# Patient Record
Sex: FEMALE | Employment: PART TIME | URBAN - METROPOLITAN AREA
[De-identification: names, ages, dates, MRNs, and addresses within clinical notes are randomized per-mention and may not be internally consistent; named-entity substitution may affect disease eponyms.]

---

## 2019-07-02 ENCOUNTER — GENETICS ENCOUNTER (OUTPATIENT)
Dept: GENETICS | Facility: HOSPITAL | Age: 54
End: 2019-07-02
Attending: GENETIC COUNSELOR, MS
Payer: COMMERCIAL

## 2019-07-02 DIAGNOSIS — Z80.3 FAMILY HISTORY OF BREAST CANCER: Primary | ICD-10-CM

## 2019-07-02 DIAGNOSIS — Z84.81 FAMILY HISTORY OF GENE MUTATION: ICD-10-CM

## 2019-07-02 PROCEDURE — 96040 HC GENETIC COUNSELING EA 30 MIN: CPT | Performed by: GENETIC COUNSELOR, MS

## 2019-07-02 NOTE — PROGRESS NOTES
Referring Provider: Quyen Cleveland     Additional Provider: Dr. Peter Martins      Reason for Referral:  Ms. Rachel Sepulveda was referred for genetic counseling because of a  family history of breast cancer and a known RAD51D mutation in th The sister (no history of cancer) underwent [facility] Common Hereditary Cancer panel. 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 Bates’s risk to develop ovarian cancer is expected to be similar to those seen in the general population.      Uncertain: Because genetic testing is complete se ages or 36-53, or earlier dependent on family history. Whether RAD51D mutations increase the risk for breast cancer has been debated. Some studies have suggested no increased risk. However, a recent large study ([facility].  2017;3(9):119 and will offer Ms. Bates the $250 self-pay price if the testing is not covered by insurance. I anticipate that Ms. Bates's results will be available within 2-3 weeks and will call her with the results.       Approximately 40 minutes was spent in [place]

## 2019-07-18 ENCOUNTER — GENETICS ENCOUNTER (OUTPATIENT)
Dept: HEMATOLOGY/ONCOLOGY | Facility: HOSPITAL | Age: 54
End: 2019-07-18

## 2019-07-18 NOTE — PROGRESS NOTES
Referring Provider: Mindy Liriano     Additional Provider: Dr. Brittany Rivero      Reason for Referral:  Ms. Kathe Bill had [facility] Common Hereditary Cancer panel genetic testing performed on 07/02/19 because of a family history of b STK11, TP53, TSC1, TSC2, VHL. Please refer to the report from [facility] for additional testing information. These results were discussed with Ms. Paulo by phone on 07/18/2019.       Ms. Anson Green was NOT detected to have the RAD51D pathogenic mutation found in h